# Patient Record
Sex: FEMALE | Race: BLACK OR AFRICAN AMERICAN | Employment: FULL TIME | URBAN - METROPOLITAN AREA
[De-identification: names, ages, dates, MRNs, and addresses within clinical notes are randomized per-mention and may not be internally consistent; named-entity substitution may affect disease eponyms.]

---

## 2019-01-27 ENCOUNTER — HOSPITAL ENCOUNTER (EMERGENCY)
Age: 29
Discharge: HOME | End: 2019-01-27
Attending: EMERGENCY MEDICINE
Payer: SELF-PAY

## 2019-01-27 VITALS
SYSTOLIC BLOOD PRESSURE: 149 MMHG | WEIGHT: 176 LBS | TEMPERATURE: 99.3 F | BODY MASS INDEX: 29.32 KG/M2 | OXYGEN SATURATION: 100 % | HEART RATE: 88 BPM | HEIGHT: 65 IN | RESPIRATION RATE: 18 BRPM | DIASTOLIC BLOOD PRESSURE: 93 MMHG

## 2019-01-27 DIAGNOSIS — L03.211 CELLULITIS, FACE: Primary | ICD-10-CM

## 2019-01-27 PROCEDURE — 99282 EMERGENCY DEPT VISIT SF MDM: CPT

## 2019-01-27 PROCEDURE — 74011250637 HC RX REV CODE- 250/637: Performed by: EMERGENCY MEDICINE

## 2019-01-27 RX ORDER — CLINDAMYCIN HYDROCHLORIDE 150 MG/1
300 CAPSULE ORAL
Status: COMPLETED | OUTPATIENT
Start: 2019-01-27 | End: 2019-01-27

## 2019-01-27 RX ORDER — CLINDAMYCIN HYDROCHLORIDE 300 MG/1
300 CAPSULE ORAL 4 TIMES DAILY
Qty: 40 CAP | Refills: 0 | Status: SHIPPED | OUTPATIENT
Start: 2019-01-27 | End: 2019-02-06

## 2019-01-27 RX ORDER — IBUPROFEN 600 MG/1
600 TABLET ORAL
Status: COMPLETED | OUTPATIENT
Start: 2019-01-27 | End: 2019-01-27

## 2019-01-27 RX ORDER — IBUPROFEN 600 MG/1
600 TABLET ORAL
Qty: 30 TAB | Refills: 0 | Status: SHIPPED | OUTPATIENT
Start: 2019-01-27

## 2019-01-27 RX ORDER — IBUPROFEN 800 MG/1
800 TABLET ORAL
COMMUNITY
End: 2019-01-27

## 2019-01-27 RX ADMIN — IBUPROFEN 600 MG: 600 TABLET ORAL at 22:07

## 2019-01-27 RX ADMIN — CLINDAMYCIN HYDROCHLORIDE 300 MG: 150 CAPSULE ORAL at 22:07

## 2019-01-27 NOTE — LETTER
NOTIFICATION RETURN TO WORK / SCHOOL 
 
1/27/2019 10:16 PM 
 
Ms. Lubna Thomson [facility] [id number] To Whom It May Concern: 
 
Anitra Tang is currently under the care of [address] [facility] EMERGENCY DEPT. Patient is to be allowed to drink fluids on the floor; in need of excessive hydration for 1 week. If there are questions or concerns please have the patient contact our office. Sincerely, Luis F Mcdonough MD

## 2019-01-28 NOTE — ED PROVIDER NOTES
EMERGENCY DEPARTMENT HISTORY AND PHYSICAL EXAM 
 
9:48 PM 
 
 
Date: 1/27/2019 Patient Name: Arabella Le 
 
History of Presenting Illness Chief Complaint Patient presents with  
 Skin Problem History Provided By: Patient Chief Complaint: cellulitis Duration:  Days Timing:  Gradual 
Location: right chin Quality: not reported Severity: N/A Modifying Factors: none Associated Symptoms: med refill Additional History (Context): Arabella Le is a 33 y.o. female with h/o RA who presents with area of erythema to her right chin area that gradually came on a few days ago. States she has had cellulitis in this area multiple time and this feels exactly the same as previous episodes; last time was around November which was treated w/ Abx as directed. Notes she is from [place] and is here for work and wont be able to f/u w/ her PCP until next week. Also reports h/o RA and would like to get a refill of her ibuprofen. Denies fever, chills, N/V, or any other associated sx. No other complaints or concerns. PCP: UNKNOWN Current Facility-Administered Medications Medication Dose Route Frequency Provider Last Rate Last Dose  clindamycin (CLEOCIN) capsule 300 mg  300 mg Oral NOW Nabila Bar MD      
 ibuprofen (MOTRIN) tablet 600 mg  600 mg Oral NOW Nabila Bar MD      
 
Current Outpatient Medications Medication Sig Dispense Refill  clindamycin (CLEOCIN) 300 mg capsule Take 1 Cap by mouth four (4) times daily for 10 days. 40 Cap 0  ibuprofen (MOTRIN) 600 mg tablet Take 1 Tab by mouth every six (6) hours as needed for Pain. 30 Tab 0 Past History Past Medical History: 
Past Medical History:  
Diagnosis Date  Rheumatoid arthritis ([facility] [address] Past Surgical History: 
Past Surgical History:  
Procedure Laterality Date  HX ORTHOPAEDIC Left ANKLE Family History: 
History reviewed. No pertinent family history. Social History: 
Social History Tobacco Use  Smoking status: Never Smoker  Smokeless tobacco: Never Used Substance Use Topics  Alcohol use: Yes Frequency: Never  Drug use: No  
 
 
Allergies: 
No Known Allergies Review of Systems Review of Systems Constitutional: Negative for chills and fever. Respiratory: Negative for shortness of breath. Cardiovascular: Negative for chest pain. Gastrointestinal: Negative for nausea and vomiting. Skin: Positive for color change (erythema). All other systems reviewed and are negative. Physical Exam  
 
Visit Vitals BP (!) 149/93 (BP 1 Location: Left arm, BP Patient Position: At rest) Pulse 88 Temp 99.3 °F (37.4 °C) Resp 18 Ht 5' 5\" (1.651 m) Wt 79.8 kg (176 lb) LMP 01/01/2019 (Approximate) SpO2 100% BMI 29.29 kg/m² Physical Exam  
Constitutional: She is oriented to person, place, and time. She appears well-developed and well-nourished. No distress. HENT:  
Head: Normocephalic. Right Ear: External ear normal.  
Left Ear: External ear normal.  
Mouth/Throat: No oropharyngeal exudate. Eyes: Conjunctivae and EOM are normal. Pupils are equal, round, and reactive to light. Right eye exhibits no discharge. Left eye exhibits no discharge. No scleral icterus. Neck: Normal range of motion. Neck supple. No JVD present. No tracheal deviation present. No thyromegaly present. Cardiovascular: Normal rate, regular rhythm, normal heart sounds and intact distal pulses. Exam reveals no gallop and no friction rub. No murmur heard. Pulmonary/Chest: Effort normal and breath sounds normal. No stridor. No respiratory distress. She has no wheezes. She has no rales. She exhibits no tenderness. Abdominal: Soft. Bowel sounds are normal. She exhibits no distension and no mass. There is no tenderness. There is no rebound and no guarding. Musculoskeletal: Normal range of motion. She exhibits no edema or tenderness. Lymphadenopathy: She has no cervical adenopathy. Neurological: She is alert and oriented to person, place, and time. She displays normal reflexes. No cranial nerve deficit. She exhibits normal muscle tone. Coordination normal.  
Skin: Skin is warm and dry. No rash noted. She is not diaphoretic. There is erythema. No pallor. Right chin w/ some erythema and small papule Nursing note and vitals reviewed. Diagnostic Study Results Labs - No results found for this or any previous visit (from the past 12 hour(s)). Radiologic Studies - No orders to display Medical Decision Making I am the first provider for this patient. I reviewed the vital signs, available nursing notes, past medical history, past surgical history, family history and social history. Vital Signs-Reviewed the patient's vital signs. Records Reviewed: Nursing Notes and Old Medical Records (Time of Review: 9:48 PM) Provider Notes (Medical Decision Making):  
Ddx: cellulitis, furuncle, carbuncle, abscess Diagnosis Clinical Impression: 1. Cellulitis, face Disposition: home Follow-up Information Follow up With Specialties Details Why Contact Info [facility]  Go to As needed [address] [place], [place] [phone] [address] [facility] Go to As needed [address] [facility] [address] ([facility]) [id number] [phone] [address] [facility] EMERGENCY DEPT Emergency Medicine  If symptoms worsen, As needed [address] [facility] [place] [id number] [phone] Medication List  
  
START taking these medications   
clindamycin 300 mg capsule Commonly known as:  CLEOCIN Take 1 Cap by mouth four (4) times daily for 10 days. CHANGE how you take these medications   
ibuprofen 600 mg tablet Commonly known as:  MOTRIN Take 1 Tab by mouth every six (6) hours as needed for Pain. What changed:   
· medication strength · how much to take · when to take this Where to Get Your Medications Information about where to get these medications is not yet available Ask your nurse or doctor about these medications · clindamycin 300 mg capsule · ibuprofen 600 mg tablet 
  
 
_______________________________ Attestations: 
Scribe Attestation Justen Mercedes acting as a scribe for and in the presence of Ashu Bowles MD     
January 27, 2019 at 9:56 PM 
    
Provider Attestation:     
I personally performed the services described in the documentation, reviewed the documentation, as recorded by the scribe in my presence, and it accurately and completely records my words and actions. January 27, 2019 at 9:56 PM - Ashu Bowles MD   
_______________________________

## 2019-01-28 NOTE — ED TRIAGE NOTES
C/o redness and swelling to chin area, onset today. States h/o cellulitis to the same area. Reports glands to right neck are swollen. Denies any fevers. H/o RA on Prednisone until 2 days ago

## 2019-01-28 NOTE — DISCHARGE INSTRUCTIONS
